# Patient Record
Sex: FEMALE | Race: OTHER | HISPANIC OR LATINO
[De-identification: names, ages, dates, MRNs, and addresses within clinical notes are randomized per-mention and may not be internally consistent; named-entity substitution may affect disease eponyms.]

---

## 2019-03-06 PROBLEM — Z00.00 ENCOUNTER FOR PREVENTIVE HEALTH EXAMINATION: Status: ACTIVE | Noted: 2019-03-06

## 2019-03-21 ENCOUNTER — APPOINTMENT (OUTPATIENT)
Dept: ORTHOPEDIC SURGERY | Facility: CLINIC | Age: 69
End: 2019-03-21
Payer: OTHER MISCELLANEOUS

## 2019-03-21 VITALS
HEART RATE: 72 BPM | HEIGHT: 60 IN | WEIGHT: 128 LBS | SYSTOLIC BLOOD PRESSURE: 127 MMHG | BODY MASS INDEX: 25.13 KG/M2 | DIASTOLIC BLOOD PRESSURE: 80 MMHG

## 2019-03-21 DIAGNOSIS — M47.812 SPONDYLOSIS W/OUT MYELOPATHY OR RADICULOPATHY, CERVICAL REGION: ICD-10-CM

## 2019-03-21 DIAGNOSIS — M50.20 OTHER CERVICAL DISC DISPLACEMENT, UNSPECIFIED CERVICAL REGION: ICD-10-CM

## 2019-03-21 DIAGNOSIS — Z85.09 PERSONAL HISTORY OF MALIGNANT NEOPLASM OF OTHER DIGESTIVE ORGANS: ICD-10-CM

## 2019-03-21 DIAGNOSIS — Z78.9 OTHER SPECIFIED HEALTH STATUS: ICD-10-CM

## 2019-03-21 DIAGNOSIS — M48.07 SPINAL STENOSIS, LUMBOSACRAL REGION: ICD-10-CM

## 2019-03-21 DIAGNOSIS — M50.30 OTHER CERVICAL DISC DEGENERATION, UNSPECIFIED CERVICAL REGION: ICD-10-CM

## 2019-03-21 DIAGNOSIS — M51.36 OTHER INTERVERTEBRAL DISC DEGENERATION, LUMBAR REGION: ICD-10-CM

## 2019-03-21 PROCEDURE — 99245 OFF/OP CONSLTJ NEW/EST HI 55: CPT

## 2019-03-21 PROCEDURE — 72040 X-RAY EXAM NECK SPINE 2-3 VW: CPT

## 2019-03-21 PROCEDURE — 72100 X-RAY EXAM L-S SPINE 2/3 VWS: CPT
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
## 2019-03-21 NOTE — DISCUSSION/SUMMARY
[de-identified] : 69 yo female with C5-C7 DDD with herniated disc, and L4-S1 DDD with central and foraminal stenosis with progressive symptoms, failed PT, NSAIDS, and TIMMY, recommend ACDF C5-C7 and  3 months later PLIF L4-S1. \par \par I reviewed all major risks, benefits and complications associated with Anterior Cervical Discectomy and Spinal Fusion including but not limited to bleeding, infection, neurological injury, CSF leak, paralysis, hematoma, implant failure, implant migration, pseudarthrosis, adjacent segment degeneration, chronic pain, worsening of pain, dysphagia, dysphonia, horners syndrome, vascular injury, anesthetic risk, chronic pain and disability, medical complications (GI, , DVT, PE, cardiac, pulmonary, etc) and risk of mortality. \par \par I reviewed all major risks, benefits, and complications associated with surgical intervention including but not limited to bleeding, infection, neurological injury, CSF leak, implant failure, implant migration, pedicle screw breech, pseudarthrosis, adjacent segment degeneration, hematoma, anesthetic risk, chronic pain and disability , medical complications (GI, , DVT, PE, cardiac, pulmonary, etc) and risk of mortality.\par

## 2019-03-21 NOTE — PHYSICAL EXAM
[ALL] : dorsalis pedis, posterior tibial, femoral, popliteal, and radial 2+ and symmetric bilaterally [Normal] : Oriented to person, place, and time, insight and judgement were intact and the affect was normal [Antalgic] : antalgic [Crutches] : ambulates with crutches [Motor Strength Upper Extremities] : bilaterally weak [NL] : normal and symmetric bilaterally [Motor Strength Lower Extremities] : left (5/5) [1+] : left brachioradialis 1+ [0] : right patella 0 [2+] : left patella 2+ [C7-LT] : C7 [C6-LT] : C6 [] : Sensory: [L5-LT] : L5 [L4-LT] : L4 [Knowles's Sign] : negative Knowles's sign [Pronator Drift] : negative pronator drift [SLR] : negative straight leg raise [de-identified] : Cervical/Lumbar ROM; limited due to pain\par TTP C/T/L spine, b/l paracervicals.  and b/l paraspinals T/L region. \par Skin intact T/L spine. No rashes, ulcers, blisters. \par No lymphedema. \par Rapid alternating movements- Intact. \par Full and non-painful ROM RUE, LUE, RLE, and LLE. Skin intact RUE, LUE, RLE, and LLE. No rashes, blisters, ulcers. NTTP RUE, LUE, RLE, and LLE. No evidence of dislocation or subluxation B/L.\par LUE Suh?neer test positive [de-identified] : 2 views AP and Lat of  Cervical Spine from occipital to C7/T1, thoracic Spine from T1-T12 and LS Spine from C57-[place] 03/21/19.  Read and dictated by Dr. Ever Kerns Board Certified orthopaedic surgeon  C5-C7 DDD and L4-S1 DDD\par \par \par MRI cervical spine Medical diagnostic Novel 04/05/17: Central disc herniation at C5-6 with impingement upon the ventral margin of the cord. Disc bulge at C6-7 with thecal sac indentation\par \par MRI thoracic spine [facility] 04/19/17: T7-8 4 mm central disc herniation impresses on the ventral cord T8-T9 3mm central disc herniation impresses on the thecal sac. T11-T12 annular disc bulge impresses on the thecal sac. T12-L1 annular disc bulge impresses on the thecal sac\par \par MRI Lumbar spine Medical diagnostic Novel 04/05/17: disc bulge at L4-5 with facet arthropathy contributing to central canal stenosis. Disc bulge at L5-S1 with B/l foraminal stenosis and impingement upon both exiting L5 nerve roots. 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

## 2019-03-21 NOTE — REASON FOR VISIT
[Initial Visit] : an initial visit for [Back Pain] : back pain [FreeTextEntry2] : Recommended for evaluation by Dr Martino

## 2019-03-21 NOTE — CONSULT LETTER
[Dear  ___] : Dear  [unfilled], [Consult Letter:] : I had the pleasure of evaluating your patient, [unfilled]. [Consult Closing:] : Thank you very much for allowing me to participate in the care of this patient.  If you have any questions, please do not hesitate to contact me. [Please see my note below.] : Please see my note below. [Sincerely,] : Sincerely, [FreeTextEntry3] : Ever Kerns MD

## 2023-07-05 ENCOUNTER — APPOINTMENT (OUTPATIENT)
Dept: ORTHOPEDIC SURGERY | Facility: CLINIC | Age: 73
End: 2023-07-05
Payer: MEDICARE

## 2023-07-05 VITALS — HEIGHT: 62 IN | WEIGHT: 128 LBS | BODY MASS INDEX: 23.55 KG/M2

## 2023-07-05 DIAGNOSIS — M25.561 PAIN IN RIGHT KNEE: ICD-10-CM

## 2023-07-05 DIAGNOSIS — M17.11 UNILATERAL PRIMARY OSTEOARTHRITIS, RIGHT KNEE: ICD-10-CM

## 2023-07-05 DIAGNOSIS — M25.569 PAIN IN UNSPECIFIED KNEE: ICD-10-CM

## 2023-07-05 PROCEDURE — 20610 DRAIN/INJ JOINT/BURSA W/O US: CPT | Mod: RT

## 2023-07-05 PROCEDURE — 99204 OFFICE O/P NEW MOD 45 MIN: CPT | Mod: 25

## 2023-07-05 PROCEDURE — 73564 X-RAY EXAM KNEE 4 OR MORE: CPT | Mod: RT

## 2023-07-05 NOTE — PHYSICAL EXAM
[Antalgic] : antalgic [Crutches] : ambulates with crutches [de-identified] : [6/15 3:04 PM] Ailyn Woods  GENERAL APPEARANCE: Well nourished and hydrated, pleasant, alert, and oriented x 3. Appears their stated age.  \par HEENT: Normocephalic, extraocular eye motion intact. Nasal septum midline. Oral cavity clear. External auditory canal clear.  \par RESPIRATORY: Breath sounds clear and audible in all lobes. No wheezing, No accessory muscle use. \par CARDIOVASCULAR: No apparent abnormalities. No lower leg edema. No varicosities. Pedal pulses are palpable. \par NEUROLOGIC: Sensation is normal, no muscle weakness in the upper or lower extremities. \par DERMATOLOGIC: No apparent skin lesions, moist, warm, no rash. \par SPINE: Cervical spine appears normal and moves freely; thoracic spine appears normal and moves freely; lumbosacral spine appears normal and moves freely, normal, nontender. \par MUSCULOSKELETAL: Hands, wrists, and elbows are normal and move freely, shoulders are normal and move freely.  \par PSYCHIATRIC: Oriented to person, place, and time, insight and judgement were intact and the affect was normal. [de-identified] : Right knee exam shows no effusion, previous incisions are well-healed, ROM is 0-120 degrees, pain with deep flexion, no instability, no pain with Moisés, anterior medial and anterior lateral joint line tenderness. Positive patellofemoral grind\par 5/5 motor strength in bilateral lower extremities. Sensory: Intact in bilateral lower extremities. DTRs: Biceps, brachioradialis, triceps, patellar, ankle and plantar 2+ and symmetric bilaterally. Pulses: dorsalis pedis, posterior tibial, femoral, popliteal, and radial 2+ and symmetric bilaterally. [de-identified] : 4 views of the right knee obtained the office today show no acute fracture or dislocation.  There is severe lateral joint space narrowing osteophyte formation patellofemoral the right is consistent with Kellgren-Danyel grade 3 changes

## 2023-07-05 NOTE — HISTORY OF PRESENT ILLNESS
[de-identified] : Jeni is a 72-year-old Haitian-speaking only female that presents today for initial evaluation of right knee pain that extends over the past 5+ years.\par No recent injury.  She is status post previous right knee arthroscopy for torn meniscus performed by another physician 5 years ago.\par She presents today with complaints of constant, moderate dull aching and throbbing pain diffusely about the knee.  Exacerbated with walking, but also present at rest.  She denies any complaints of elmer instability.  She currently denies any radiating pain or paresthesia of the right leg, but did suffer a low back injury many years ago.  She currently denies any groin pain.\par Recent treatment has included activity modification, the use of over-the-counter Advil and Tylenol without relief.  She does walk with a crutch for assistance.

## 2023-07-05 NOTE — REASON FOR VISIT
[Initial Visit] : an initial visit for [Ad Hoc ] : provided by an ad hoc  [FreeTextEntry2] : right knee pain [Interpreters_IDNumber] : [id number] [Interpreters_FullName] : chad

## 2023-07-05 NOTE — PROCEDURE
[Injection] : Injection [Right] : of the right [Osteoarthritis] : Osteoarthritis [Joint Pain] : Joint pain [Patient] : patient [Alcohol] : Alcohol [Ethyl Chloride Spray] : ethyl chloride spray was used as a topical anesthetic [Lateral] : lateral [1% Lidocaine___(mL)] : [unfilled] mL of 1% Lidocaine [Methylpred. 40mg/mL___(mL)] : [unfilled] mL of 40mg/mL methylprednisolone [Bandage Applied] : a bandage [Tolerated Well] : The patient tolerated the procedure well [None] : none [___ Month(s)] : in [unfilled] month(s) [de-identified] : I injected the right knee.  \par \par I discussed at length with the patient the planned steroid and lidocaine injection. The risks, benefits, convalescence and alternatives were reviewed. The possible side effects discussed included but were not limited to: pain, swelling, heat, bleeding, and redness. Symptoms are generally mild but if they are extensive then contact the office. Giving pain relievers by mouth such as NSAIDs or Tylenol can generally treat the reactions to steroid and lidocaine. Rare cases of infection have been noted. Rash, hives and itching may occur post injection. If you have muscle pain or cramps, flushing and or swelling of the face, rapid heart beat, nausea, dizziness, fever, chills, headache, difficulty breathing, swelling in the arms or legs, or have a prickly feeling of your skin, contact a health care provider immediately. Following this discussion, the knee was prepped with Alcohol and under sterile condition the 80 mg Depo-Medrol and 6 cc Lidocaine injection was performed with a 20 gauge needle through a superolateral injection site. The needle was introduced into the joint, aspiration was performed to ensure intra-articular placement and the medication was injected. Upon withdrawal of the needle the site was cleaned with alcohol and a band aid applied. The patient tolerated the injection well and there were no adverse effects. Post injection instructions included no strenuous activity for 24 hours, cryotherapy and if there are any adverse effects to contact the office.

## 2023-07-05 NOTE — DISCUSSION/SUMMARY
[Medication Risks Reviewed] : Medication risks reviewed [Surgical risks reviewed] : Surgical risks reviewed [de-identified] : Patient is a 72-year-old female with moderate to severe right knee osteoarthritis most pronounced in the lateral compartment presenting today for initial evaluation.  She has not yet tried conservative treatment I think that is warranted at this time.  I gave her injection of cortisone which she tolerated well.  I recommended physical therapy.  She will take Tylenol and NSAIDs as needed for the pain.  I will see her back in 2 months for repeat evaluation.  All questions were asked and answered.  She was advised that she may be candidate for a total knee arthroplasty in the future.

## 2023-07-11 ENCOUNTER — APPOINTMENT (OUTPATIENT)
Dept: ORTHOPEDIC SURGERY | Facility: CLINIC | Age: 73
End: 2023-07-11

## 2023-07-28 ENCOUNTER — APPOINTMENT (OUTPATIENT)
Dept: ORTHOPEDIC SURGERY | Facility: CLINIC | Age: 73
End: 2023-07-28

## 2023-09-06 ENCOUNTER — APPOINTMENT (OUTPATIENT)
Dept: ORTHOPEDIC SURGERY | Facility: CLINIC | Age: 73
End: 2023-09-06

## 2023-11-16 ENCOUNTER — NON-APPOINTMENT (OUTPATIENT)
Age: 73
End: 2023-11-16

## 2023-11-16 ENCOUNTER — APPOINTMENT (OUTPATIENT)
Dept: GASTROENTEROLOGY | Facility: CLINIC | Age: 73
End: 2023-11-16
Payer: MEDICARE

## 2023-11-16 VITALS
RESPIRATION RATE: 14 BRPM | HEIGHT: 62 IN | HEART RATE: 83 BPM | SYSTOLIC BLOOD PRESSURE: 113 MMHG | OXYGEN SATURATION: 98 % | DIASTOLIC BLOOD PRESSURE: 71 MMHG | WEIGHT: 143 LBS | BODY MASS INDEX: 26.31 KG/M2

## 2023-11-16 PROCEDURE — 99204 OFFICE O/P NEW MOD 45 MIN: CPT

## 2023-11-16 RX ORDER — ALENDRONATE SODIUM 70 MG/1
70 TABLET ORAL
Refills: 0 | Status: ACTIVE | COMMUNITY

## 2023-11-16 RX ORDER — PNV NO.95/FERROUS FUM/FOLIC AC 28MG-0.8MG
TABLET ORAL
Refills: 0 | Status: DISCONTINUED | COMMUNITY
End: 2023-11-16

## 2023-11-16 RX ORDER — OMEPRAZOLE 40 MG/1
40 CAPSULE, DELAYED RELEASE ORAL
Qty: 30 | Refills: 2 | Status: ACTIVE | COMMUNITY
Start: 2023-11-16 | End: 1900-01-01

## 2024-01-03 ENCOUNTER — TRANSCRIPTION ENCOUNTER (OUTPATIENT)
Age: 74
End: 2024-01-03

## 2024-01-04 ENCOUNTER — RESULT REVIEW (OUTPATIENT)
Age: 74
End: 2024-01-04

## 2024-01-04 ENCOUNTER — OUTPATIENT (OUTPATIENT)
Dept: OUTPATIENT SERVICES | Facility: HOSPITAL | Age: 74
LOS: 1 days | End: 2024-01-04
Payer: MEDICARE

## 2024-01-04 ENCOUNTER — APPOINTMENT (OUTPATIENT)
Dept: GASTROENTEROLOGY | Facility: GI CENTER | Age: 74
End: 2024-01-04
Payer: MEDICARE

## 2024-01-04 DIAGNOSIS — R10.11 RIGHT UPPER QUADRANT PAIN: ICD-10-CM

## 2024-01-04 PROCEDURE — 43239 EGD BIOPSY SINGLE/MULTIPLE: CPT

## 2024-01-04 PROCEDURE — 88342 IMHCHEM/IMCYTCHM 1ST ANTB: CPT

## 2024-01-04 PROCEDURE — 88342 IMHCHEM/IMCYTCHM 1ST ANTB: CPT | Mod: 26

## 2024-01-04 PROCEDURE — 88305 TISSUE EXAM BY PATHOLOGIST: CPT | Mod: 26

## 2024-01-04 PROCEDURE — 88305 TISSUE EXAM BY PATHOLOGIST: CPT

## 2024-01-04 NOTE — PHYSICAL EXAM
[Alert] : alert [Normal Voice/Communication] : normal voice/communication [Healthy Appearing] : healthy appearing [No Acute Distress] : no acute distress [Well Developed] : well developed [Well Nourished] : well nourished [Sclera] : the sclera and conjunctiva were normal [Hearing Threshold Finger Rub Not [place]] : hearing was normal [Normal Lips/Gums] : the lips and gums were normal [Oropharynx] : the oropharynx was normal [Normal Appearance] : the appearance of the neck was normal [No Neck Mass] : no neck mass was observed [No Respiratory Distress] : no respiratory distress [No Acc Muscle Use] : no accessory muscle use [Respiration, Rhythm And Depth] : normal respiratory rhythm and effort [Auscultation Breath Sounds / Voice Sounds] : lungs were clear to auscultation bilaterally [Heart Rate And Rhythm] : heart rate was normal and rhythm regular [Normal S1, S2] : normal S1 and S2 [Murmurs] : no murmurs [None] : no edema [Bowel Sounds] : normal bowel sounds [Abdomen Tenderness] : non-tender [No Masses] : no abdominal mass palpated [Abdomen Soft] : soft [] : no hepatosplenomegaly [Oriented To Time, Place, And Person] : oriented to person, place, and time

## 2024-01-04 NOTE — REVIEW OF SYSTEMS
[As Noted in HPI] : as noted in HPI [Abdominal Pain] : abdominal pain [Vomiting] : no vomiting [Constipation] : no constipation [Diarrhea] : no diarrhea [Heartburn] : no heartburn [Melena (black stool)] : no melena [Bleeding] : no bleeding [Fecal Incontinence (soiling)] : no fecal incontinence [Bloating (gassiness)] : no bloating [Negative] : Heme/Lymph

## 2024-01-18 LAB — SURGICAL PATHOLOGY STUDY: SIGNIFICANT CHANGE UP

## 2024-03-06 ENCOUNTER — OFFICE (OUTPATIENT)
Dept: URBAN - METROPOLITAN AREA CLINIC 104 | Facility: CLINIC | Age: 74
Setting detail: OPHTHALMOLOGY
End: 2024-03-06
Payer: MEDICARE

## 2024-03-06 ENCOUNTER — RX ONLY (RX ONLY)
Age: 74
End: 2024-03-06

## 2024-03-06 DIAGNOSIS — H02.821: ICD-10-CM

## 2024-03-06 PROCEDURE — 67840 REMOVE EYELID LESION: CPT | Mod: E3 | Performed by: OPHTHALMOLOGY

## 2024-03-06 PROCEDURE — 92285 EXTERNAL OCULAR PHOTOGRAPHY: CPT | Performed by: OPHTHALMOLOGY

## 2024-03-06 ASSESSMENT — REFRACTION_CURRENTRX
OS_ADD: +2.50
OD_ADD: +2.50
OS_OVR_VA: 20/
OD_CYLINDER: -0.50
OS_VPRISM_DIRECTION: PROGS
OD_SPHERE: +4.00
OD_VPRISM_DIRECTION: PROGS
OS_SPHERE: +4.00
OD_OVR_VA: 20/
OS_CYLINDER: -0.75
OS_AXIS: 105
OD_AXIS: 35

## 2024-03-20 ENCOUNTER — OFFICE (OUTPATIENT)
Dept: URBAN - METROPOLITAN AREA CLINIC 104 | Facility: CLINIC | Age: 74
Setting detail: OPHTHALMOLOGY
End: 2024-03-20
Payer: MEDICARE

## 2024-03-20 ENCOUNTER — RX ONLY (RX ONLY)
Age: 74
End: 2024-03-20

## 2024-03-20 DIAGNOSIS — D48.9: ICD-10-CM

## 2024-03-20 PROCEDURE — 11106 INCAL BX SKN SINGLE LES: CPT | Mod: RT | Performed by: OPHTHALMOLOGY

## 2024-03-21 PROBLEM — D48.9 NEOPLASM OF UNCERTAIN BEHAVIOR: Status: ACTIVE | Noted: 2024-03-20

## 2024-03-21 PROBLEM — H02.821 LID LESION; RIGHT UPPER LID: Status: ACTIVE | Noted: 2024-03-06

## 2024-04-08 ENCOUNTER — APPOINTMENT (OUTPATIENT)
Dept: GASTROENTEROLOGY | Facility: CLINIC | Age: 74
End: 2024-04-08
Payer: MEDICARE

## 2024-04-08 VITALS
BODY MASS INDEX: 25.76 KG/M2 | DIASTOLIC BLOOD PRESSURE: 77 MMHG | OXYGEN SATURATION: 98 % | RESPIRATION RATE: 15 BRPM | HEART RATE: 73 BPM | SYSTOLIC BLOOD PRESSURE: 112 MMHG | HEIGHT: 62 IN | WEIGHT: 140 LBS

## 2024-04-08 DIAGNOSIS — R10.11 RIGHT UPPER QUADRANT PAIN: ICD-10-CM

## 2024-04-08 DIAGNOSIS — R10.13 EPIGASTRIC PAIN: ICD-10-CM

## 2024-04-08 DIAGNOSIS — G89.29 RIGHT UPPER QUADRANT PAIN: ICD-10-CM

## 2024-04-08 PROCEDURE — 99214 OFFICE O/P EST MOD 30 MIN: CPT

## 2024-04-08 NOTE — ASSESSMENT
[FreeTextEntry1] : Patient is a 73 year old female, with PMH of arthritis, osteoporosis, h/o gallbladder cancer and now s/p cholecystectomy in 2016 with radiation and chemotherapy, who presents for follow up RUQ pain and burning.   RUQ pain s/p cholecystectomy in 2016 - MRI/MRCP showed no biliary obstructions, s/p cholecystectomy - Labs showed normal LFTs - EGD was normal, negative h.pylori and intestinal metaplasia - Possibly musculoskeletal?   RTC in 6 months  Pt will call her previous GI to determine when screening colonoscopy is due. Colonoscopy in 2019 in [place].

## 2024-04-08 NOTE — REASON FOR VISIT
[Consultation] : a consultation visit [Family Member] : family member [[employer] ] : provided by [employer]   [FreeTextEntry1] : RUQ pain [Interpreters_FullName] : Rylan [[id number]] : [place]

## 2024-04-08 NOTE — PHYSICAL EXAM
[Alert] : alert [Normal Voice/Communication] : normal voice/communication [Healthy Appearing] : healthy appearing [No Acute Distress] : no acute distress [Sclera] : the sclera and conjunctiva were normal [Hearing Threshold Finger Rub Not [place]] : hearing was normal [Normal Lips/Gums] : the lips and gums were normal [Normal Appearance] : the appearance of the neck was normal [No Neck Mass] : no neck mass was observed [No Respiratory Distress] : no respiratory distress [No Acc Muscle Use] : no accessory muscle use [Respiration, Rhythm And Depth] : normal respiratory rhythm and effort [Auscultation Breath Sounds / Voice Sounds] : lungs were clear to auscultation bilaterally [Heart Rate And Rhythm] : heart rate was normal and rhythm regular [Normal S1, S2] : normal S1 and S2 [Bowel Sounds] : normal bowel sounds [Abdomen Tenderness] : non-tender [No Masses] : no abdominal mass palpated [Abdomen Soft] : soft [] : no hepatosplenomegaly [Oriented To Time, Place, And Person] : oriented to person, place, and time [de-identified] : cane to ambulate, lumbar brace

## 2024-04-08 NOTE — HISTORY OF PRESENT ILLNESS
[FreeTextEntry1] : Patient is a 73 year old female, with PMH of arthritis, osteoporosis, h/o gallbladder cancer and now s/p cholecystectomy in 2016 with radiation and chemotherapy, who presents for follow up RUQ pain and burning.   She states the pain is predominantly on the RUQ but also in epigastric pain. No heartburn or reflux. Pain is worse when eating and sometimes with laying down. She has right knee pain, previously seen by ortho. She takes Tylenol for pain, denies NSAIDs. Has chronic back pain since  work injury 6-7 years ago and is wearing a lumbar support brace today.   PT is s/p EGD with Dr. Mercedes on 1/4/24 which was normal, negative h.pylori, negative intestinal metaplasia. Pt had MRI/MRCP done in December 2023 which showed no biliary obstruction. Labs done 12/6/2023 showed normal LFTs, Hgb 12.0g,  Pt had a colonoscopy in [place] in 2019, no polyps at that time. She thinks she was told to repeat in 10 years.   Patient denies any significant cardiac or pulmonary conditions.   Patient denies pyrosis, dysphagia, change in BMs, rectal bleeding, nausea, vomiting, or unexplained weight loss. [de-identified] : EGD with Dr. Mercedes on 1/4/24 which was normal, negative h.pylori, negative intestinal metaplasia.

## 2024-09-06 ENCOUNTER — APPOINTMENT (OUTPATIENT)
Dept: GASTROENTEROLOGY | Facility: CLINIC | Age: 74
End: 2024-09-06

## (undated) DEVICE — VALVE ENDO SURESEAL II 0-5FR

## (undated) DEVICE — FORCEP ENDOJAW AGTR LC W NDL 2.8MM 230CM DISP